# Patient Record
Sex: MALE | Race: WHITE
[De-identification: names, ages, dates, MRNs, and addresses within clinical notes are randomized per-mention and may not be internally consistent; named-entity substitution may affect disease eponyms.]

---

## 2023-07-17 ENCOUNTER — HOSPITAL ENCOUNTER (EMERGENCY)
Dept: HOSPITAL 33 - ED | Age: 28
Discharge: HOME | End: 2023-07-17
Payer: COMMERCIAL

## 2023-07-17 VITALS — HEART RATE: 78 BPM | OXYGEN SATURATION: 98 %
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
## 2023-07-17 DIAGNOSIS — S05.01XA: Primary | ICD-10-CM
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
## 2023-07-17 DIAGNOSIS — Z28.310: ICD-10-CM

## 2023-07-17 PROCEDURE — 99281 EMR DPT VST MAYX REQ PHY/QHP: CPT

## 2023-07-17 NOTE — ERPHSYRPT
- History of Present Illness


Source: patient


Exam Limitations: no limitations


Patient Subjective Stated Complaint: To er c/o injury to right eye onset 1 days 

pta. pt reports he was building a dog house using a circular saw to cut wood and

felt a piece go into eye. Pt flushed area immediately and continued working woke

this and with increased pain and visual distrubtance.


Triage Nursing Assessment: Pt p/w/d resp easy non labored. pt reports pain of 

2/10. pt states pain and vision worsens when he is looking straight ahead. PT 

right eye is very red to eye and to eye lid, minimal swelling noted pt having 

difficulty opening completely.  EYE EXAM.  Both: 20/50.  Left: 20/50.  Right: 

20/100


Physician History: 





28 yo WM w R ocular pain after working w [employer] yesterday. Pt feels like he has

something in his eye. He does not wear contact lenses but does wear glasses. 

There is no discharge. 


Timing/Duration: yesterday


Location: right eye


Severity: mild


Apparent Injury: possibly


Associated Symptoms: redness, foreign body sensation


Visual Assistive Devices: Glasses


Chemical Exposure: No


Trauma: No


[place]/Tanning Bed Exposure: No


Allergies/Adverse Reactions: 








No Known Drug Allergies Allergy (Unverified 07/17/23 12:22)


   





Home Medications: 








No Reportable Medications [No Reported Medications]  07/17/23 [History]





Hx Tetanus, Diphtheria Vaccination/Date Given: No





Travel Risk





- International Travel


Have you traveled outside of the country in past 3 weeks: No





- Coronavirus Screening


Are you exhibiting any of the following symptoms?: No


Close contact with a COVID-19 positive Pt in past 14-21 Days: No





- Vaccine Status


Have you recieved a Covid-19 vaccination: No





- Review of Systems


Constitutional: No Symptoms


Eyes: No Symptoms, Eye Pain, Eye Redness


Ears, Nose, & Throat: No Symptoms


Respiratory: No Symptoms


Cardiac: No Symptoms


Abdominal/Gastrointestinal: No Symptoms


Genitourinary Symptoms: No Symptoms


Musculoskeletal: No Symptoms


Skin: No Symptoms


Neurological: No Symptoms


Psychological: No Symptoms


Endocrine: No Symptoms


Hematologic/Lymphatic: No Symptoms


Immunological/Allergic: No Symptoms





- Past Medical History


Pertinent Past Medical History: No


Other Medical History: kidney stones





- Past Surgical History


Other Surgical History: stent to left kidney for frequent kidney stones





- Social History


Smoking Status: Never smoker


Drug Use: none





- Nursing Vital Signs


Nursing Vital Signs: 


                               Initial Vital Signs











Temperature  98.7 F   07/17/23 12:47


 


Pulse Rate  78   07/17/23 12:47


 


Respiratory Rate  18   07/17/23 12:47


 


O2 Sat by Pulse Oximetry  98   07/17/23 12:47








                                   Pain Scale











Pain Intensity                 2

















- Physical Exam


General Appearance: no apparent distress


Vision Acuity Degree Evaluation Phase: Uncorrected


Vision Acuity Right Eye: 20/50


Vision Acuity Left Eye: 20/50


Eye Exam: right eye: other (R eye anes w Tetracaine/No FB w regular 

exam/Fluorescein applied, UV exam w small CB at 6 O'clock/No FB observed), 

bilateral eye: PERRL, EOMI


Ears, Nose, Throat Exam: normal ENT inspection, TMs normal, pharynx normal


Neck Exam: normal inspection, non-tender, supple, full range of motion, No 

meningismus, No mass, No Brudzinski, No Kernig's


Respiratory Exam: normal breath sounds, lungs clear, airway intact, No 

respiratory distress


Cardiovascular Exam: regular rate/rhythm, normal heart sounds, normal peripheral

 pulses, capillary refill <2 sec, edema, No murmur


Gastrointestinal Exam: soft, normal bowel sounds, No tenderness


Extremity Exam: normal inspection, normal range of motion


Neurologic: alert, oriented x 3, cooperative, CNs II-XII nml as tested, normal 

mood/affect, nml cerebellar function, nml station & gait, sensation nml


Skin Exam: normal color, warm, dry


Lymphatic: No adenopathy





Procedures





- Eye Procedure


Time of Procedure: 12:46


Tetracaine Drops Administered: Yes


Eye FB Removal: no removal w/ cotton swab, no removal w/ needle


Remaining Material after FB Removal: none





- Course


Nursing assessment & vital signs reviewed: Yes


Ordered Tests: 


Medication Summary














Discontinued Medications














Generic Name Dose Route Start Last Admin





  Trade Name Steph  PRN Reason Stop Dose Admin


 


Hydrocodone Bitart/Acetaminophen  1 tab  07/17/23 12:44  07/17/23 12:47





  Hydrocodone/Apap 5/325 1 Tab Tablet  PO  07/17/23 12:45  1 tab





  STAT ONE   Administration


 


Hydrocodone Bitart/Acetaminophen  Confirm  07/17/23 12:46 





  Hydrocodone/Apap 5/325 1 Tab Tablet  Administered  07/17/23 12:47 





  Dose  





  1 tab  





  .ROUTE  





  .STK-MED ONE  


 


Eye Irrigation Solution  15 ml  07/17/23 12:24  07/17/23 12:28





  Sodium/Potassium/Des/Magnesium 30 Ml Eye Wash  OP  07/17/23 12:25  15 ml





  STAT ONE   Administration


 


Eye Irrigation Solution  Confirm  07/17/23 12:28 





  Sodium/Potassium/Des/Magnesium 30 Ml Eye Wash  Administered  07/17/23 12:29 





  Dose  





  30 ml  





  .ROUTE  





  .STK-MED ONE  


 


Fluorescein Sodium  1 mg  07/17/23 12:24  07/17/23 12:28





  Fluorescein Sodium 1 Mg/Strip Strip  OP  07/17/23 12:25  1 mg





  STAT ONE   Administration


 


Fluorescein Sodium  Confirm  07/17/23 12:27 





  Fluorescein Sodium 1 Mg/Strip Strip  Administered  07/17/23 12:28 





  Dose  





  1 mg  





  OP  





  .STK-MED ONE  


 


Tetracaine HCl  4 ml  07/17/23 12:23  07/17/23 12:28





  Tetracaine Hcl/Pf 4 Ml Bottle  OP  07/17/23 12:24  4 ml





  STAT STA   Administration


 


Tetracaine HCl  Confirm  07/17/23 12:27 





  Tetracaine Hcl/Pf 4 Ml Bottle  Administered  07/17/23 12:28 





  Dose  





  4 ml  





  OP  





  .STK-MED ONE  














- Progress


Progress: improved


Progress Note: 





07/17/23 15:40


Nursing note and vital signs reviewed


No food or housing insecurities noted


Tetracaine R eye per physician


No evidence of FB on exam


Fluorscein R eye per physician/UV exam w small abrasion at 6 o'clock


Exam somewhat limited because pt had trouble keeping his eye open, even w 

physician traction


Norco 5po x1


Pt advised to f/u w eye doctor for any visual impairment or continued pain


Counseled pt/family regarding: diagnosis, need for follow-up





Medical Desision Making





- Independent Historian


Additional History obtained from: Spouse





- Risk of complications


The pt has a mod risk of morbidity or mortality based on: Need for prescription 

drug management





- Departure


Departure Disposition: Home


Clinical Impression: 


 Corneal abrasion





Condition: Stable


Critical Care Time: No


Referrals: 


Provider,Unknown [NON-STAFF PHY W/O PRIVILEGES] - Follow up/PCP as directed


Instructions:  Corneal Abrasion (DC)


Additional Instructions: 


Go to a dark room and keep eye shut for 8-12 hours


Do not rub eye


Ciloxan 2 drops every 4 hours for 5 days


Follow up with your eye doctor if pain persists or if any visual impairment

## 2023-10-14 ENCOUNTER — HOSPITAL ENCOUNTER (EMERGENCY)
Dept: HOSPITAL 33 - ED | Age: 28
Discharge: HOME | End: 2023-10-14
Payer: COMMERCIAL

## 2023-10-14 VITALS
DIASTOLIC BLOOD PRESSURE: 86 MMHG | OXYGEN SATURATION: 99 % | TEMPERATURE: 98.1 F | SYSTOLIC BLOOD PRESSURE: 138 MMHG | HEART RATE: 95 BPM
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
## 2023-10-14 DIAGNOSIS — Z23: ICD-10-CM

## 2023-10-14 DIAGNOSIS — Z28.310: ICD-10-CM

## 2023-10-14 DIAGNOSIS — S61.012A: Primary | ICD-10-CM

## 2023-10-14 PROCEDURE — 99282 EMERGENCY DEPT VISIT SF MDM: CPT

## 2023-10-14 PROCEDURE — 12002 RPR S/N/AX/GEN/TRNK2.6-7.5CM: CPT

## 2023-10-14 PROCEDURE — 90471 IMMUNIZATION ADMIN: CPT

## 2023-10-14 PROCEDURE — 90715 TDAP VACCINE 7 YRS/> IM: CPT

## 2023-10-14 NOTE — ERPHSYRPT
- History of Present Illness


Time Seen by Provider: 10/14/23 10:56


Source: patient


Exam Limitations: no limitations


Patient Subjective Stated Complaint: Pt cut left thumb with a razor knife


Triage Nursing Assessment: Pt brought self to the ER, vitals wnl, rates pain as 

8/10, 3.5-4 cm laceration to the left proximal thumb, moderately bleeding, 

pulses normal, skin n/w/d, pt is right handed, doesn't appear to be in any 

distress


Physician History: 





27 years old right-hand-dominant male presented in the ER with chief complaint 

of left thumb laceration while he was cutting stuff with a razor blade prior to 

arrival.  Patient reports moderate intensity sharp pain with palpation and 

movements.  Continuously bleeding.  No numbness or tingling at the thumb tip.  

Intact movements at interphalangeal joint but limited because of pain.





Patient has a 3.5 cm curved laceration left thumb proximal phalanx with slow 

oozing.  No active spurting noticed.  Able to flex extend at interphalangeal 

joint.  Intact sensations at the thumb tip.  Cap refill less than 3 seconds.





Under local anesthesia laceration is repaired.  Tetanus is updated.  It was a 

clean wound, do not think needs antibiotic.  Recommended outpatient follow-up.


Allergies/Adverse Reactions: 








codeine Allergy (Verified 10/14/23 10:50)


   





Hx Tetanus, Diphtheria Vaccination/Date Given: No





Travel Risk





- International Travel


Have you traveled outside of the country in past 3 weeks: No





- Coronavirus Screening


Are you exhibiting any of the following symptoms?: No


Close contact with a COVID-19 positive Pt in past 14-21 Days: No





- Vaccine Status


Have you recieved a Covid-19 vaccination: No





- Review of Systems


Constitutional: No Symptoms


Ears, Nose, & Throat: No Symptoms


Respiratory: No Symptoms


Cardiac: No Symptoms


Abdominal/Gastrointestinal: No Symptoms


Genitourinary Symptoms: No Symptoms


Musculoskeletal: Injury


Skin: Skin Lesions


Neurological: No Symptoms


Endocrine: No Symptoms





- Past Medical History


Pertinent Past Medical History: Yes


 History: Other


Other Medical History: kidney stones





- Past Surgical History


Past Surgical History: Yes


Other Surgical History: stent to left kidney for frequent kidney stones





- Social History


Smoking Status: Former smoker


Exposure to second hand smoke: No


Drug Use: none


Patient Lives Alone: No





- Nursing Vital Signs


Nursing Vital Signs: 


                               Initial Vital Signs











Temperature  98.1 F   10/14/23 10:41


 


Pulse Rate  95 H  10/14/23 10:41


 


Blood Pressure  138/86   10/14/23 10:41


 


O2 Sat by Pulse Oximetry  99   10/14/23 10:41








                                   Pain Scale











Pain Intensity                 8

















- Physical Exam


General Appearance: no apparent distress, alert


Eye Exam: PERRL/EOMI


Neck Exam: normal inspection, full range of motion


Respiratory Exam: normal breath sounds, lungs clear


Cardiovascular Exam: regular rate/rhythm, normal heart sounds


Back Exam: normal inspection


Extremity Exam: normal range of motion, lacerations (3.5 cm laceration left 

thumb.), tenderness


Neurologic Exam: alert, oriented x 3, cooperative, CNs II-XII nml as tested


Skin Exam: normal color


**SpO2 Interpretation**: normal


SpO2: 99


O2 Delivery: Room Air





Procedures





- Laceration/Wound Repair


  ** Left Dorsal Hand


Time of Procedure: 11:00


Wound Location: Left


Wound Length (cm): 3.5


Wound's Depth, Shape: into muscle


Wound Explored: clean


Irrigated: Yes


Hibiclens Prep: Yes


Anesthesia: 1% Lidocaine


Volume Anesthetic (ccs): 8


Wound Repaired With: sutures


Suture Size/Type: 4-0


Number of Sutures: 7


Layer Closure?: No


Sterile Dressing Applied?: Yes


Ordered Tests: 


Medication Summary














Discontinued Medications














Generic Name Dose Route Start Last Admin





  Trade Name Joseq  PRN Reason Stop Dose Admin


 


Diphtheria/Tetanus/Acell Pertussis  0.5 ml  10/14/23 10:50  10/14/23 10:52





  Tdap --Diph,Pertuss(Acell),Tet Vac/Pf 0.5 Ml Vial  IM  10/14/23 10:51  0.5 ml





  .ONCE ONE   Administration


 


Diphtheria/Tetanus/Acell Pertussis  Confirm  10/14/23 10:52 





  Tdap --Diph,Pertuss(Acell),Tet Vac/Pf 0.5 Ml Vial  Administered  10/14/23 

10:53 





  Dose  





  0.5 ml  





  IM  





  .STK-MED ONE  














- Progress


Progress Note: 





10/14/23 11:13


27 years old right-hand-dominant male presented in the ER with chief complaint 

of left thumb laceration while he was cutting stuff with a razor blade prior to 

arrival.  Patient reports moderate intensity sharp pain with palpation and 

movements.  Continuously bleeding.  No numbness or tingling at the thumb tip.  

Intact movements at interphalangeal joint but limited because of pain.





Patient has a 3.5 cm curved laceration left thumb proximal phalanx with slow 

oozing.  No active spurting noticed.  Able to flex extend at interphalangeal 

joint.  Intact sensations at the thumb tip.  Cap refill less than 3 seconds.





Under local anesthesia laceration is repaired.  Tetanus is updated.  It was a 

clean wound, do not think needs antibiotic.  Recommended outpatient follow-up.


Counseled pt/family regarding: diagnosis, need for follow-up





Medical Desision Making





- Diagnostic Testing


Diagnostic test were ordered, analyzed, and reviewed by me: No





- Departure


Departure Disposition: Home


Clinical Impression: 


 Thumb laceration





Condition: Stable


Critical Care Time: No


Referrals: 


DOCTOR,NO FAMILY [Primary Care Provider] - Follow up with PCP 2 days


Instructions:  Laceration Repair With Stitches (DC)


Additional Instructions: 


Take Tylenol/ibuprofen as needed.  Intermittent ice application.  Follow-up with

primary care for reevaluation in 1 to 2 days.  Return to ER for intractable 

pain/swelling/discharge/numbness tingling in the thumb.  Suture removal in 2 

weeks.


Prescriptions: 


Ibuprofen 600 mg PO Q6HPRN PRN 10 Days #20 tablet


 PRN Reason: Pain